# Patient Record
Sex: MALE | Race: WHITE | NOT HISPANIC OR LATINO | Employment: UNEMPLOYED | URBAN - METROPOLITAN AREA
[De-identification: names, ages, dates, MRNs, and addresses within clinical notes are randomized per-mention and may not be internally consistent; named-entity substitution may affect disease eponyms.]

---
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
## 2022-01-04 ENCOUNTER — CLINICAL SUPPORT (OUTPATIENT)
Dept: FAMILY MEDICINE CLINIC | Facility: CLINIC | Age: 22
End: 2022-01-04

## 2022-01-04 DIAGNOSIS — B34.9 VIRAL ILLNESS: Primary | ICD-10-CM

## 2022-01-04 PROCEDURE — 87636 SARSCOV2 & INF A&B AMP PRB: CPT | Performed by: PHYSICIAN ASSISTANT

## 2022-01-09 LAB
FLUAV RNA RESP QL NAA+PROBE: NEGATIVE
FLUBV RNA RESP QL NAA+PROBE: NEGATIVE
SARS-COV-2 RNA RESP QL NAA+PROBE: NEGATIVE

## 2022-07-15 ENCOUNTER — TELEMEDICINE (OUTPATIENT)
Dept: FAMILY MEDICINE CLINIC | Facility: CLINIC | Age: 22
End: 2022-07-15

## 2022-07-15 VITALS — WEIGHT: 122 LBS | HEIGHT: 68 IN | TEMPERATURE: 98.9 F | BODY MASS INDEX: 18.49 KG/M2

## 2022-07-15 DIAGNOSIS — U07.1 COVID-19: Primary | ICD-10-CM

## 2022-07-15 DIAGNOSIS — J45.31 MILD PERSISTENT ASTHMA WITH ACUTE EXACERBATION: ICD-10-CM

## 2022-07-15 PROCEDURE — 99214 OFFICE O/P EST MOD 30 MIN: CPT | Performed by: NURSE PRACTITIONER

## 2022-07-15 RX ORDER — ALBUTEROL SULFATE 2.5 MG/3ML
2.5 SOLUTION RESPIRATORY (INHALATION) EVERY 6 HOURS PRN
Qty: 25 ML | Refills: 0 | Status: SHIPPED | OUTPATIENT
Start: 2022-07-15

## 2022-07-15 RX ORDER — ALBUTEROL SULFATE 90 UG/1
2 AEROSOL, METERED RESPIRATORY (INHALATION) EVERY 6 HOURS PRN
Qty: 8 G | Refills: 2 | Status: SHIPPED | OUTPATIENT
Start: 2022-07-15

## 2022-07-15 RX ORDER — BUDESONIDE 180 UG/1
4 AEROSOL, POWDER RESPIRATORY (INHALATION) 2 TIMES DAILY
Qty: 1 EACH | Refills: 0 | Status: SHIPPED | OUTPATIENT
Start: 2022-07-15 | End: 2022-07-29

## 2022-07-15 NOTE — PROGRESS NOTES
COVID-19 Outpatient Progress Note    Assessment/Plan:    Pt presents for positive covid test at home Wednesday 07/13  He notes he started with symptoms Tuesday 07/12  Patient notes he does have hx of asthma  Pulmicort prescribed  Medication and s/e reviewed  He is to continue Robitussin and Advil PRN  He is to rest and keep well hydrated and is encouraged to take Vitamin C, D, zinc   Quarantine guidelines reviewed  Patient is encouraged to call our office for any questions/concerns, persistent or worsening symptoms  Patient states they understand and agree with treatment plan  Problem List Items Addressed This Visit    None     Visit Diagnoses     COVID-19    -  Primary    Relevant Medications    albuterol (PROVENTIL HFA,VENTOLIN HFA) 90 mcg/act inhaler    budesonide (Pulmicort Flexhaler) 180 MCG/ACT inhaler    Mild persistent asthma with acute exacerbation        Relevant Medications    albuterol (2 5 mg/3 mL) 0 083 % nebulizer solution    albuterol (PROVENTIL HFA,VENTOLIN HFA) 90 mcg/act inhaler    budesonide (Pulmicort Flexhaler) 180 MCG/ACT inhaler         Disposition:     Patient has COVID-19 infection  Based off CDC guidelines, they were recommended to isolate for 5 days from the date of the positive test  If they remain asymptomatic, isolation may be ended followed by 5 days of wearing a mask when around othes to minimize risk of infecting others  If they have a fever, continue to stay home until fever resolves for at least 24 hours  I have spent 15 minutes directly with the patient  Greater than 50% of this time was spent in counseling/coordination of care regarding: instructions for management and patient and family education        Encounter provider RICHARD Cohen    Provider located at [address] [place] [address]  [phone]    Recent Visits  No visits were found meeting these conditions  Showing recent visits within past 7 days and meeting all other requirements  Today's Visits  Date Type Provider Dept   07/15/22 Telemedicine RICHARD [address]   Showing today's visits and meeting all other requirements  Future Appointments  No visits were found meeting these conditions  Showing future appointments within next 150 days and meeting all other requirements     This virtual check-in was done via [employer] and patient was informed that this is a secure, HIPAA-compliant platform  He agrees to proceed  Patient agrees to participate in a virtual check in via telephone or video visit instead of presenting to the office to address urgent/immediate medical needs  Patient is aware this is a billable service  After connecting through [facility], the patient was identified by name and date of birth  Marisabel Ambriz was informed that this was a telemedicine visit and that the exam was being conducted confidentially over secure lines  My office door was closed  No one else was in the room  Marisabel Ambriz acknowledged consent and understanding of privacy and security of the telemedicine visit  I informed the patient that I have reviewed his record in Epic and presented the opportunity for him to ask any questions regarding the visit today  The patient agreed to participate  Verification of patient location:  Patient is located in the following state in which I hold an active license: [place]    Subjective:   Marisabel Ambriz is a 25 y o  male who has been screened for COVID-19  Symptom change since last report: unchanged  Patient's symptoms include chills, sore throat, cough (dry), shortness of breath (occasional, O2 at 98% per pt report), myalgias and headache  Patient denies fever, fatigue, anosmia, loss of taste, nausea, vomiting and diarrhea  - Date of symptom onset: 7/12/2022  - Date of positive COVID-19 test: 7/13/2022   Type of test: Home antigen  Home antigen result verified by provider  Will get picture of test uploaded/scanned into patient's chart  COVID-19 vaccination status: Fully vaccinated (primary series)    Gio Christine has been staying home and has isolated themselves in his home  He is taking care to not share personal items and is cleaning all surfaces that are touched often, like counters, tabletops, and doorknobs using household cleaning sprays or wipes  He is wearing a mask when he leaves his room  Pt presents for positive covid test at home Wednesday 07/13  He notes he started with symptoms Tuesday 07/12  Patient notes he does have hx of asthma  Pulmicort prescribed  Medication and s/e reviewed  Quarantine guidelines reviewed  Lab Results   Component Value Date    SARSCOV2 Negative 01/04/2022    [address] Not Detected 11/22/2020     Past Medical History:   Diagnosis Date    Asthma     Depression     Elevated serum creatinine     Migraine     RSV (acute bronchiolitis due to respiratory syncytial virus)     X2     Whooping cough      Past Surgical History:   Procedure Laterality Date    HERNIA REPAIR      MEATOPLASTY      WISDOM TOOTH EXTRACTION       Current Outpatient Medications   Medication Sig Dispense Refill    albuterol (2 5 mg/3 mL) 0 083 % nebulizer solution Take 3 mL (2 5 mg total) by nebulization every 6 (six) hours as needed for wheezing or shortness of breath 25 mL 0    albuterol (PROVENTIL HFA,VENTOLIN HFA) 90 mcg/act inhaler Inhale 2 puffs every 6 (six) hours as needed for wheezing 8 g 2    budesonide (Pulmicort Flexhaler) 180 MCG/ACT inhaler Inhale 4 puffs 2 (two) times a day for 14 days Rinse mouth after use  1 each 0    fluticasone-salmeterol (ADVAIR HFA) 115-21 MCG/ACT inhaler as needed       ipratropium (Atrovent HFA) 17 mcg/act inhaler        No current facility-administered medications for this visit       Allergies   Allergen Reactions    Benzoyl Peroxide Itching and Swelling       Review of Systems   Constitutional: Positive for chills  Negative for fatigue and fever  HENT: Positive for sore throat  Respiratory: Positive for cough (dry) and shortness of breath (occasional, O2 at 98% per pt report)  Gastrointestinal: Negative for diarrhea, nausea and vomiting  Musculoskeletal: Positive for myalgias  Neurological: Positive for headaches  Objective:    Vitals:    07/15/22 1426   Temp: 98 9 °F (37 2 °C)   TempSrc: Oral   Weight: 55 3 kg (122 lb)   Height: 5' 8" (1 727 m)       Physical Exam  Constitutional:       Appearance: Normal appearance  He is well-developed  He is not ill-appearing  Pulmonary:      Effort: Pulmonary effort is normal    Neurological:      Mental Status: He is alert and oriented to person, place, and time  Mental status is at baseline  Psychiatric:         Mood and Affect: Mood normal          Behavior: Behavior normal          Judgment: Judgment normal          VIRTUAL VISIT Woo verbally agrees to participate in [employer]  Pt is aware that [employer] could be limited without vital signs or the ability to perform a full hands-on physical Kenny English understands he or the provider may request at any time to terminate the video visit and request the patient to seek care or treatment in person

## 2022-11-25 ENCOUNTER — APPOINTMENT (OUTPATIENT)
Dept: LAB | Facility: CLINIC | Age: 22
End: 2022-11-25

## 2022-11-25 ENCOUNTER — OFFICE VISIT (OUTPATIENT)
Dept: FAMILY MEDICINE CLINIC | Facility: CLINIC | Age: 22
End: 2022-11-25

## 2022-11-25 VITALS
BODY MASS INDEX: 18.61 KG/M2 | DIASTOLIC BLOOD PRESSURE: 84 MMHG | HEART RATE: 119 BPM | SYSTOLIC BLOOD PRESSURE: 124 MMHG | WEIGHT: 122.8 LBS | RESPIRATION RATE: 16 BRPM | HEIGHT: 68 IN

## 2022-11-25 DIAGNOSIS — R00.0 TACHYCARDIA: Primary | ICD-10-CM

## 2022-11-25 DIAGNOSIS — E44.1 MILD PROTEIN-CALORIE MALNUTRITION (HCC): ICD-10-CM

## 2022-11-25 DIAGNOSIS — R00.0 TACHYCARDIA: ICD-10-CM

## 2022-11-25 DIAGNOSIS — F41.1 GAD (GENERALIZED ANXIETY DISORDER): ICD-10-CM

## 2022-11-25 LAB
ALBUMIN SERPL BCP-MCNC: 4.5 G/DL (ref 3.5–5)
ALP SERPL-CCNC: 73 U/L (ref 46–116)
ALT SERPL W P-5'-P-CCNC: 32 U/L (ref 12–78)
ANION GAP SERPL CALCULATED.3IONS-SCNC: 6 MMOL/L (ref 4–13)
AST SERPL W P-5'-P-CCNC: 21 U/L (ref 5–45)
BASOPHILS # BLD AUTO: 0.07 THOUSANDS/ÂΜL (ref 0–0.1)
BASOPHILS NFR BLD AUTO: 1 % (ref 0–1)
BILIRUB SERPL-MCNC: 0.43 MG/DL (ref 0.2–1)
BUN SERPL-MCNC: 10 MG/DL (ref 5–25)
CALCIUM SERPL-MCNC: 10.1 MG/DL (ref 8.3–10.1)
CHLORIDE SERPL-SCNC: 106 MMOL/L (ref 96–108)
CO2 SERPL-SCNC: 26 MMOL/L (ref 21–32)
CREAT SERPL-MCNC: 0.9 MG/DL (ref 0.6–1.3)
EOSINOPHIL # BLD AUTO: 0.01 THOUSAND/ÂΜL (ref 0–0.61)
EOSINOPHIL NFR BLD AUTO: 0 % (ref 0–6)
ERYTHROCYTE [DISTWIDTH] IN BLOOD BY AUTOMATED COUNT: 14 % (ref 11.6–15.1)
GFR SERPL CREATININE-BSD FRML MDRD: 122 ML/MIN/1.73SQ M
GLUCOSE P FAST SERPL-MCNC: 96 MG/DL (ref 65–99)
HCT VFR BLD AUTO: 51 % (ref 36.5–49.3)
HGB BLD-MCNC: 16.5 G/DL (ref 12–17)
IMM GRANULOCYTES # BLD AUTO: 0.05 THOUSAND/UL (ref 0–0.2)
IMM GRANULOCYTES NFR BLD AUTO: 0 % (ref 0–2)
LYMPHOCYTES # BLD AUTO: 1.46 THOUSANDS/ÂΜL (ref 0.6–4.47)
LYMPHOCYTES NFR BLD AUTO: 11 % (ref 14–44)
MCH RBC QN AUTO: 30.6 PG (ref 26.8–34.3)
MCHC RBC AUTO-ENTMCNC: 32.4 G/DL (ref 31.4–37.4)
MCV RBC AUTO: 94 FL (ref 82–98)
MONOCYTES # BLD AUTO: 0.9 THOUSAND/ÂΜL (ref 0.17–1.22)
MONOCYTES NFR BLD AUTO: 7 % (ref 4–12)
NEUTROPHILS # BLD AUTO: 10.49 THOUSANDS/ÂΜL (ref 1.85–7.62)
NEUTS SEG NFR BLD AUTO: 81 % (ref 43–75)
NRBC BLD AUTO-RTO: 0 /100 WBCS
PLATELET # BLD AUTO: 276 THOUSANDS/UL (ref 149–390)
PMV BLD AUTO: 10.5 FL (ref 8.9–12.7)
POTASSIUM SERPL-SCNC: 4.4 MMOL/L (ref 3.5–5.3)
PROT SERPL-MCNC: 7.8 G/DL (ref 6.4–8.4)
RBC # BLD AUTO: 5.4 MILLION/UL (ref 3.88–5.62)
SODIUM SERPL-SCNC: 138 MMOL/L (ref 135–147)
TSH SERPL DL<=0.05 MIU/L-ACNC: 1.04 UIU/ML (ref 0.45–4.5)
WBC # BLD AUTO: 12.98 THOUSAND/UL (ref 4.31–10.16)

## 2022-11-25 NOTE — LETTER
To Whom it May Concern    Roman Ellis,  2000, has suffered from generalized anxiety disorder for the past 4 years  Having a pet cat has proven to be beneficial for him in treating this while living at home and we believe he would benefit from this now      Ramón Calvillo PA-C

## 2022-11-25 NOTE — PROGRESS NOTES
Assessment/Plan:    1  SUKHJINDER - treating with medical marijuana, well controlled, note written for cat in apartment    2  Tachycardia - pulse today rechecked by myself 100 bpm, denies stimulants, will check labs to start then echo/holter consider cardio eval    3  Low BMI 18- work on healthy diet    F/u pending results  F/u as needed    Subjective:   Chief Complaint   Patient presents with   • Anxiety      Patient ID: Alan Martinez is a 23 y o  male  Patient was seen at school for an illness, was seen for 3 weeks straight  While sitting BP was high and laying flat normal      Had medical marijuana card and this is the only medication he is on  Uses this twice daily, has not used today  No nicotine, no caffeine  Mom with history of thyroid, twin sister currently being worked up for same thing  Etiology unclear        The following portions of the patient's history were reviewed and updated as appropriate: allergies, current medications, past family history, past medical history, past social history, past surgical history and problem list     Past Medical History:   Diagnosis Date   • Asthma    • Depression    • Elevated serum creatinine    • Migraine    • RSV (acute bronchiolitis due to respiratory syncytial virus)     X2    • Whooping cough      Past Surgical History:   Procedure Laterality Date   • HERNIA REPAIR     • MEATOPLASTY     • WISDOM TOOTH EXTRACTION       Family History   Problem Relation Age of Onset   • Multiple sclerosis Mother    • Anxiety disorder Mother    • Alcohol abuse Father    • Depression Father    • Schizophrenia Maternal Aunt    • No Known Problems Sister    • Substance Abuse Neg Hx    • Mental illness Neg Hx      Social History     Socioeconomic History   • Marital status: Single     Spouse name: Not on file   • Number of children: 0   • Years of education: Not on file   • Highest education level: Not on file   Occupational History   • Not on file   Tobacco Use   • Smoking status: Never   • Smokeless tobacco: Never   Vaping Use   • Vaping Use: Never used   Substance and Sexual Activity   • Alcohol use: No   • Drug use: No   • Sexual activity: Not on file   Other Topics Concern   • Not on file   Social History Narrative    Who lives in your home: Mother and sister     What type of home do you live in: Single family home     Age of your home: Built 1991     How long have you been living there: 1999    Type of heat: baseboard (basement) forced hot air     Type of fuel: electric     What type of merced is in your bedroom: carpet     Do you have the following in or near your home:    Air products: Central air, ionic purifier, humidifier winter months-dehumidifier in the basement     Pests: none     Pets: Cat (Scamp) Dog (Savage) Turtle (violoet)     Are pets allowed in the bedroom: cat     Open fields, wooded areas nearby: both     Basement:finished-dry-no mold or musty smell     Exposure to second hand smoke: no        Habits:    Caffeine: none      Chocolate: couple times a week     Other:     Social Determinants of Health     Financial Resource Strain: Not on file   Food Insecurity: Not on file   Transportation Needs: Not on file   Physical Activity: Not on file   Stress: Not on file   Social Connections: Not on file   Intimate Partner Violence: Not on file   Housing Stability: Not on file       Current Outpatient Medications:   •  albuterol (2 5 mg/3 mL) 0 083 % nebulizer solution, Take 3 mL (2 5 mg total) by nebulization every 6 (six) hours as needed for wheezing or shortness of breath, Disp: 25 mL, Rfl: 0  •  albuterol (PROVENTIL HFA,VENTOLIN HFA) 90 mcg/act inhaler, Inhale 2 puffs every 6 (six) hours as needed for wheezing, Disp: 8 g, Rfl: 2  •  fluticasone-salmeterol (ADVAIR HFA) 115-21 MCG/ACT inhaler, as needed , Disp: , Rfl:   •  ipratropium (Atrovent HFA) 17 mcg/act inhaler, , Disp: , Rfl:   •  budesonide (Pulmicort Flexhaler) 180 MCG/ACT inhaler, Inhale 4 puffs 2 (two) times a day for 14 days Rinse mouth after use , Disp: 1 each, Rfl: 0    Review of Systems          Objective:    Vitals:    11/25/22 1231   BP: 124/84   Pulse: (!) 119   Resp: 16   Weight: 55 7 kg (122 lb 12 8 oz)   Height: 5' 7 5" (1 715 m)        Physical Exam  Constitutional:       Appearance: Normal appearance  He is normal weight  HENT:      Head: Normocephalic and atraumatic  Cardiovascular:      Rate and Rhythm: Regular rhythm  Tachycardia present  Pulses: Normal pulses  Heart sounds: Normal heart sounds  Pulmonary:      Effort: Pulmonary effort is normal       Breath sounds: Normal breath sounds  Musculoskeletal:         General: Normal range of motion  Skin:     General: Skin is warm  Neurological:      General: No focal deficit present  Mental Status: He is alert and oriented to person, place, and time  Psychiatric:         Mood and Affect: Mood normal          Behavior: Behavior normal          Thought Content:  Thought content normal          Judgment: Judgment normal

## 2022-11-30 ENCOUNTER — TELEPHONE (OUTPATIENT)
Dept: FAMILY MEDICINE CLINIC | Facility: CLINIC | Age: 22
End: 2022-11-30

## 2022-11-30 NOTE — TELEPHONE ENCOUNTER
----- Message from Ramón Calvillo PA-C sent at 11/30/2022  9:09 AM EST -----    Please call patient and let him know his thyroid was normal, but his white blood cells came back elevated, did he get sick after his appointment?    ----- Message -----  From: Lab, Background User  Sent: 11/25/2022   4:19 PM EST  To: Ramón Calvillo PA-C

## 2025-01-08 ENCOUNTER — OFFICE VISIT (OUTPATIENT)
Dept: FAMILY MEDICINE CLINIC | Facility: CLINIC | Age: 25
End: 2025-01-08
Payer: COMMERCIAL

## 2025-01-08 VITALS
DIASTOLIC BLOOD PRESSURE: 70 MMHG | OXYGEN SATURATION: 98 % | HEART RATE: 67 BPM | RESPIRATION RATE: 16 BRPM | WEIGHT: 129 LBS | TEMPERATURE: 98.7 F | HEIGHT: 68 IN | BODY MASS INDEX: 19.55 KG/M2 | SYSTOLIC BLOOD PRESSURE: 114 MMHG

## 2025-01-08 DIAGNOSIS — F41.1 GAD (GENERALIZED ANXIETY DISORDER): Primary | ICD-10-CM

## 2025-01-08 DIAGNOSIS — D72.829 LEUKOCYTOSIS, UNSPECIFIED TYPE: ICD-10-CM

## 2025-01-08 DIAGNOSIS — F31.60 BIPOLAR 1 DISORDER, MIXED (HCC): ICD-10-CM

## 2025-01-08 PROCEDURE — 99214 OFFICE O/P EST MOD 30 MIN: CPT | Performed by: PHYSICIAN ASSISTANT

## 2025-01-08 RX ORDER — OLANZAPINE 5 MG/1
5 TABLET ORAL
COMMUNITY
Start: 2025-01-02

## 2025-01-08 RX ORDER — FOLIC ACID 1 MG/1
TABLET ORAL
COMMUNITY
Start: 2025-01-02

## 2025-01-08 RX ORDER — QUETIAPINE FUMARATE 50 MG/1
50 TABLET, FILM COATED ORAL EVERY 4 HOURS PRN
Qty: 20 TABLET | Refills: 0 | Status: SHIPPED | OUTPATIENT
Start: 2025-01-08

## 2025-01-08 RX ORDER — MULTIVITAMIN
1 CAPSULE ORAL DAILY
COMMUNITY

## 2025-01-08 NOTE — PROGRESS NOTES
Assessment/Plan:    Bipolar with depression - reviewed patients records from [facility] at length today, inpatient at [facility] for 8 days then discharged home, already seen therapist, appt next Tuesday with psych. Will bridge seoquel 50 mg 1 tab qid prn anxiety as was prescribed at [facility] and noted in discharge summary. Continue abilify 5 mg and prozac 20 mg.    Leukocytosis - 2022 and forward isolated leukocytosis, will repeat in 2 months, and if still elevated consider hematology eval    F/u as needed    Subjective:   Chief Complaint   Patient presents with    Depression     Patient is here for follow-up from behavioral health at [facility]  Following with behavioral heath in [place]      Patient ID: Beto Silva is a 24 y.o. male.    Patient was taken to [facility] on 12/24 with suicidal ideation with diane. Was discharged to [facility] facility for 8 days then discharged home with mother 1/2/2025. While inpatient was on abilify, prozac daily, seroquel prn anxiety and trazodone prn insomnia. Discharged home on abilify and prozac. Met with therapist yesterday and admitted he was anxious and the therapist encouraged him to reach out to us to refill his seoquel prn. Does not feel he needs trazodone as he is taking melatonin now and this helps. Has appt with Psych on Tuesday at 10 am and he is hopeful they will take over prescribing of this.     Also notes his WBC have been running high since 2022. Wondering what he should do about this.          The following portions of the patient's history were reviewed and updated as appropriate: allergies, current medications, past family history, past medical history, past social history, past surgical history, and problem list.    Past Medical History:   Diagnosis Date    Asthma     Depression     Elevated serum creatinine     Migraine     RSV (acute bronchiolitis due to respiratory syncytial virus)     X2     Whooping cough      Past Surgical History:   Procedure Laterality Date     HERNIA REPAIR      MEATOPLASTY      WISDOM TOOTH EXTRACTION       Family History   Problem Relation Age of Onset    Multiple sclerosis Mother     Anxiety disorder Mother     Alcohol abuse Father     Depression Father     Schizophrenia Maternal Aunt     No Known Problems Sister     Substance Abuse Neg Hx     Mental illness Neg Hx      Social History     Socioeconomic History    Marital status: Single     Spouse name: Not on file    Number of children: 0    Years of education: Not on file    Highest education level: Not on file   Occupational History    Not on file   Tobacco Use    Smoking status: Never    Smokeless tobacco: Never   Vaping Use    Vaping status: Every Day   Substance and Sexual Activity    Alcohol use: No    Drug use: Not Currently     Types: Marijuana    Sexual activity: Not on file   Other Topics Concern    Not on file   Social History Narrative    Who lives in your home: Mother and sister     What type of home do you live in: Single family home     Age of your home: Built 1991     How long have you been living there: 1999    Type of heat: baseboard (basement) forced hot air     Type of fuel: electric     What type of merced is in your bedroom: carpet     Do you have the following in or near your home:    Air products: Central air, ionic purifier, humidifier winter months-dehumidifier in the basement     Pests: none     Pets: Cat (Scamp) Dog (Savage) Turtle (violoet)     Are pets allowed in the bedroom: cat     Open fields, wooded areas nearby: both     Basement:finished-dry-no mold or musty smell     Exposure to second hand smoke: no        Habits:    Caffeine: none      Chocolate: couple times a week     Other:     Social Drivers of Health     Financial Resource Strain: Not on file   Food Insecurity: Not on file   Transportation Needs: Not on file   Physical Activity: Sufficiently Active (5/17/2021)    Exercise Vital Sign     Days of Exercise per Week: 7 days     Minutes of Exercise per Session:  "30 min   Stress: Not on file   Social Connections: Not on file   Intimate Partner Violence: Not on file   Housing Stability: Not on file       Current Outpatient Medications:     albuterol (2.5 mg/3 mL) 0.083 % nebulizer solution, Take 3 mL (2.5 mg total) by nebulization every 6 (six) hours as needed for wheezing or shortness of breath, Disp: 25 mL, Rfl: 0    albuterol (PROVENTIL HFA,VENTOLIN HFA) 90 mcg/act inhaler, Inhale 2 puffs every 6 (six) hours as needed for wheezing, Disp: 8 g, Rfl: 2    FLUoxetine (PROzac) 20 mg capsule, Take 20 mg by mouth daily, Disp: , Rfl:     folic acid (FOLVITE) 1 mg tablet, , Disp: , Rfl:     Multiple Vitamin (multivitamin) capsule, Take 1 capsule by mouth daily, Disp: , Rfl:     OLANZapine (ZyPREXA) 5 mg tablet, Take 5 mg by mouth daily at bedtime, Disp: , Rfl:     budesonide (Pulmicort Flexhaler) 180 MCG/ACT inhaler, Inhale 4 puffs 2 (two) times a day for 14 days Rinse mouth after use., Disp: 1 each, Rfl: 0    fluticasone-salmeterol (ADVAIR HFA) 115-21 MCG/ACT inhaler, as needed  (Patient not taking: Reported on 1/8/2025), Disp: , Rfl:     ipratropium (Atrovent HFA) 17 mcg/act inhaler, , Disp: , Rfl:     Review of Systems          Objective:    Vitals:    01/08/25 0908   BP: 114/70   Pulse: 67   Resp: 16   Temp: 98.7 °F (37.1 °C)   TempSrc: Temporal   SpO2: 98%   Weight: 58.5 kg (129 lb)   Height: 5' 8\" (1.727 m)        Physical Exam  Constitutional:       Appearance: Normal appearance.   HENT:      Head: Normocephalic and atraumatic.   Neurological:      General: No focal deficit present.      Mental Status: He is alert and oriented to person, place, and time.   Psychiatric:         Mood and Affect: Mood normal.         Behavior: Behavior normal.         Thought Content: Thought content normal.         Judgment: Judgment normal.           I have spent a total time of 32 minutes in caring for this patient on the day of the visit/encounter including Counseling / Coordination of " care, Reviewing / ordering tests, medicine, procedures  , and Obtaining or reviewing history  .

## 2025-06-23 ENCOUNTER — OFFICE VISIT (OUTPATIENT)
Dept: FAMILY MEDICINE CLINIC | Facility: CLINIC | Age: 25
End: 2025-06-23
Payer: COMMERCIAL

## 2025-06-23 ENCOUNTER — APPOINTMENT (OUTPATIENT)
Dept: LAB | Facility: CLINIC | Age: 25
End: 2025-06-23
Payer: COMMERCIAL

## 2025-06-23 VITALS
HEART RATE: 88 BPM | TEMPERATURE: 98.6 F | SYSTOLIC BLOOD PRESSURE: 132 MMHG | OXYGEN SATURATION: 98 % | DIASTOLIC BLOOD PRESSURE: 84 MMHG | WEIGHT: 136 LBS | BODY MASS INDEX: 20.61 KG/M2 | HEIGHT: 68 IN | RESPIRATION RATE: 16 BRPM

## 2025-06-23 DIAGNOSIS — M25.50 ARTHRALGIA, UNSPECIFIED JOINT: Primary | ICD-10-CM

## 2025-06-23 DIAGNOSIS — R69 TAKING MEDICATION FOR CHRONIC DISEASE: ICD-10-CM

## 2025-06-23 DIAGNOSIS — T14.8XXA BRUISING: ICD-10-CM

## 2025-06-23 DIAGNOSIS — M25.50 ARTHRALGIA, UNSPECIFIED JOINT: ICD-10-CM

## 2025-06-23 LAB
ALBUMIN SERPL BCG-MCNC: 5 G/DL (ref 3.5–5)
ALP SERPL-CCNC: 79 U/L (ref 34–104)
ALT SERPL W P-5'-P-CCNC: 46 U/L (ref 7–52)
ANION GAP SERPL CALCULATED.3IONS-SCNC: 10 MMOL/L (ref 4–13)
AST SERPL W P-5'-P-CCNC: 29 U/L (ref 13–39)
B BURGDOR IGG+IGM SER QL IA: NEGATIVE
BILIRUB SERPL-MCNC: 0.58 MG/DL (ref 0.2–1)
BUN SERPL-MCNC: 9 MG/DL (ref 5–25)
CALCIUM SERPL-MCNC: 9.6 MG/DL (ref 8.4–10.2)
CHLORIDE SERPL-SCNC: 102 MMOL/L (ref 96–108)
CO2 SERPL-SCNC: 30 MMOL/L (ref 21–32)
CREAT SERPL-MCNC: 0.98 MG/DL (ref 0.6–1.3)
CRP SERPL QL: <1 MG/L
ERYTHROCYTE [DISTWIDTH] IN BLOOD BY AUTOMATED COUNT: 13 % (ref 11.6–15.1)
ERYTHROCYTE [SEDIMENTATION RATE] IN BLOOD: 5 MM/HOUR (ref 0–14)
GFR SERPL CREATININE-BSD FRML MDRD: 108 ML/MIN/1.73SQ M
GLUCOSE SERPL-MCNC: 89 MG/DL (ref 65–140)
HCT VFR BLD AUTO: 48 % (ref 36.5–49.3)
HGB BLD-MCNC: 15.4 G/DL (ref 12–17)
MCH RBC QN AUTO: 31.1 PG (ref 26.8–34.3)
MCHC RBC AUTO-ENTMCNC: 32.1 G/DL (ref 31.4–37.4)
MCV RBC AUTO: 97 FL (ref 82–98)
PLATELET # BLD AUTO: 281 THOUSANDS/UL (ref 149–390)
PMV BLD AUTO: 10.5 FL (ref 8.9–12.7)
POTASSIUM SERPL-SCNC: 4.2 MMOL/L (ref 3.5–5.3)
PROT SERPL-MCNC: 7.6 G/DL (ref 6.4–8.4)
RBC # BLD AUTO: 4.95 MILLION/UL (ref 3.88–5.62)
RHEUMATOID FACT SERPL-ACNC: <10 IU/ML
SODIUM SERPL-SCNC: 142 MMOL/L (ref 135–147)
T4 FREE SERPL-MCNC: 0.59 NG/DL (ref 0.61–1.12)
TSH SERPL DL<=0.05 MIU/L-ACNC: 4.85 UIU/ML (ref 0.45–4.5)
WBC # BLD AUTO: 6.1 THOUSAND/UL (ref 4.31–10.16)

## 2025-06-23 PROCEDURE — 86431 RHEUMATOID FACTOR QUANT: CPT

## 2025-06-23 PROCEDURE — 86200 CCP ANTIBODY: CPT

## 2025-06-23 PROCEDURE — 99214 OFFICE O/P EST MOD 30 MIN: CPT | Performed by: PHYSICIAN ASSISTANT

## 2025-06-23 PROCEDURE — 85652 RBC SED RATE AUTOMATED: CPT

## 2025-06-23 PROCEDURE — 36415 COLL VENOUS BLD VENIPUNCTURE: CPT

## 2025-06-23 PROCEDURE — 86225 DNA ANTIBODY NATIVE: CPT

## 2025-06-23 PROCEDURE — 84443 ASSAY THYROID STIM HORMONE: CPT

## 2025-06-23 PROCEDURE — 80053 COMPREHEN METABOLIC PANEL: CPT

## 2025-06-23 PROCEDURE — 84439 ASSAY OF FREE THYROXINE: CPT

## 2025-06-23 PROCEDURE — 86235 NUCLEAR ANTIGEN ANTIBODY: CPT

## 2025-06-23 PROCEDURE — 86038 ANTINUCLEAR ANTIBODIES: CPT

## 2025-06-23 PROCEDURE — 85027 COMPLETE CBC AUTOMATED: CPT

## 2025-06-23 PROCEDURE — 86140 C-REACTIVE PROTEIN: CPT

## 2025-06-23 PROCEDURE — 86618 LYME DISEASE ANTIBODY: CPT

## 2025-06-23 RX ORDER — LITHIUM CARBONATE 300 MG/1
600 TABLET, FILM COATED, EXTENDED RELEASE ORAL
COMMUNITY
Start: 2025-06-17

## 2025-06-23 NOTE — PROGRESS NOTES
"Name: Beto Silva      : 2000      MRN: [id number]  Encounter Provider: Bonnie Shahid PA-C  Encounter Date: 2025   Encounter department: [facility]  :  Assessment & Plan  Arthralgia, unspecified joint    Swelling that waxes and wanes with a family history of rheumatological conditions, will start with labs  Orders:    CBC and Platelet; Future    Comprehensive Metabolic Panel; Future    RHEUMATOID FACTOR; Future    Cyclic citrul peptide antibody, IgG; Future    Sjogren's Antibodies; Future    Lyme Total AB W Reflex to IGM/IGG; Future    C-reactive protein; Future    Sedimentation rate, automated; Future    MATIAS Screen w/Reflex [place]; Future    Bruising    Unprovoked, no meds or supplements, will start with CBC  Orders:    CBC and Platelet; Future    Taking medication for chronic disease    On lithium for one month, under care [facility]  Orders:    TSH, 3rd generation with Free T4 reflex; Future    F/u as needed       History of Present Illness   Beginning of  noticed left knee swelling, will come and go. On days when it is swollen hurts. Ices knee and elevates and swelling goes away. Sometimes knee feels like it is locking. Denies rashes, fever, chills. Some generalized joint pain but not like knees. Sometimes gets zo cheeks. Takes Tylenol. Walking and running nothing.    Also with weird bruising. Coming and going without trauma.    On Lithium 600 mg bedtime for one month, will have labs checked in July. [facility].     Mom - rheumatoid arthritis  Aunt -SLE          Review of Systems    Objective   /84   Pulse 88   Temp 98.6 °F (37 °C) (Temporal)   Resp 16   Ht 5' 7.5\" (1.715 m)   Wt 61.7 kg (136 lb)   SpO2 98%   BMI 20.99 kg/m²      Physical Exam  Constitutional:       Appearance: Normal appearance. He is well-developed and normal weight.   HENT:      Head: Normocephalic and atraumatic.     Musculoskeletal:         " General: No swelling, tenderness or deformity. Normal range of motion.      Cervical back: Normal range of motion.     Skin:     General: Skin is warm.      Findings: No rash.     Neurological:      General: No focal deficit present.      Mental Status: He is alert and oriented to person, place, and time.     Psychiatric:         Mood and Affect: Mood normal.         Behavior: Behavior normal.         Thought Content: Thought content normal.         Judgment: Judgment normal.

## 2025-06-30 LAB
DSDNA IGG SERPL IA-ACNC: <0.9 IU/ML (ref ?–15)
NUCLEAR IGG SER IA-RTO: <0.09 RATIO (ref ?–1)

## 2025-07-02 LAB
CCP AB SER IA-ACNC: 1.3 (ref ?–10)
ENA SS-A AB SER IA-ACNC: <0.5 U/ML (ref ?–10)
ENA SS-B IGG SER IA-ACNC: <0.6 U/ML (ref ?–10)

## 2025-07-24 ENCOUNTER — CLINICAL SUPPORT (OUTPATIENT)
Dept: FAMILY MEDICINE CLINIC | Facility: CLINIC | Age: 25
End: 2025-07-24
Payer: COMMERCIAL

## 2025-07-24 DIAGNOSIS — Z23 ENCOUNTER FOR IMMUNIZATION: Primary | ICD-10-CM

## 2025-07-24 PROCEDURE — NURSE

## 2025-07-24 PROCEDURE — 90471 IMMUNIZATION ADMIN: CPT

## 2025-07-24 PROCEDURE — 90715 TDAP VACCINE 7 YRS/> IM: CPT

## 2025-07-30 ENCOUNTER — APPOINTMENT (OUTPATIENT)
Dept: PHYSICAL THERAPY | Facility: MEDICAL CENTER | Age: 25
End: 2025-07-30

## 2025-07-30 PROCEDURE — 97530 THERAPEUTIC ACTIVITIES: CPT | Performed by: PHYSICAL THERAPIST

## 2025-08-09 ENCOUNTER — OFFICE VISIT (OUTPATIENT)
Dept: URGENT CARE | Facility: CLINIC | Age: 25
End: 2025-08-09
Payer: COMMERCIAL

## 2025-08-09 ENCOUNTER — APPOINTMENT (OUTPATIENT)
Dept: RADIOLOGY | Facility: CLINIC | Age: 25
End: 2025-08-09
Payer: COMMERCIAL

## 2025-08-09 VITALS
DIASTOLIC BLOOD PRESSURE: 84 MMHG | TEMPERATURE: 99.4 F | HEART RATE: 70 BPM | OXYGEN SATURATION: 99 % | RESPIRATION RATE: 16 BRPM | SYSTOLIC BLOOD PRESSURE: 139 MMHG

## 2025-08-09 DIAGNOSIS — J02.9 SORE THROAT: ICD-10-CM

## 2025-08-09 DIAGNOSIS — S89.92XA LEFT KNEE INJURY, INITIAL ENCOUNTER: Primary | ICD-10-CM

## 2025-08-09 LAB — S PYO AG THROAT QL: NEGATIVE

## 2025-08-09 PROCEDURE — 99214 OFFICE O/P EST MOD 30 MIN: CPT

## 2025-08-09 PROCEDURE — 87880 STREP A ASSAY W/OPTIC: CPT

## 2025-08-09 PROCEDURE — 87070 CULTURE OTHR SPECIMN AEROBIC: CPT

## 2025-08-09 RX ORDER — NAPROXEN 500 MG/1
500 TABLET ORAL 2 TIMES DAILY WITH MEALS
Qty: 10 TABLET | Refills: 0 | Status: SHIPPED | OUTPATIENT
Start: 2025-08-09 | End: 2025-08-14

## 2025-08-09 RX ORDER — ARIPIPRAZOLE 10 MG/1
TABLET ORAL
COMMUNITY
Start: 2025-07-22

## 2025-08-10 LAB — BACTERIA THROAT CULT: NORMAL

## 2025-08-11 LAB — BACTERIA THROAT CULT: NORMAL
